# Patient Record
Sex: MALE
[De-identification: names, ages, dates, MRNs, and addresses within clinical notes are randomized per-mention and may not be internally consistent; named-entity substitution may affect disease eponyms.]

---
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
## 2019-12-19 ENCOUNTER — HOSPITAL ENCOUNTER (OUTPATIENT)
Dept: HOSPITAL 53 - M LAB REF | Age: 19
End: 2019-12-19
Attending: PHYSICIAN ASSISTANT
Payer: COMMERCIAL

## 2019-12-19 DIAGNOSIS — E03.9: ICD-10-CM

## 2019-12-19 DIAGNOSIS — G47.33: Primary | ICD-10-CM

## 2019-12-19 DIAGNOSIS — E55.9: ICD-10-CM

## 2019-12-19 LAB
CHLAMYDIA DNA AMPLIFICATION: NEGATIVE
N GONORRHOEA RRNA SPEC QL NAA+PROBE: NEGATIVE

## 2021-02-19 ENCOUNTER — HOSPITAL ENCOUNTER (EMERGENCY)
Dept: HOSPITAL 53 - M ED | Age: 21
Discharge: HOME | End: 2021-02-19
Payer: COMMERCIAL

## 2021-02-19 VITALS — HEIGHT: 66 IN | WEIGHT: 155.32 LBS | BODY MASS INDEX: 24.96 KG/M2

## 2021-02-19 VITALS — DIASTOLIC BLOOD PRESSURE: 62 MMHG | SYSTOLIC BLOOD PRESSURE: 116 MMHG

## 2021-02-19 DIAGNOSIS — R68.83: ICD-10-CM

## 2021-02-19 DIAGNOSIS — Z72.51: ICD-10-CM

## 2021-02-19 DIAGNOSIS — J06.9: Primary | ICD-10-CM

## 2021-02-19 DIAGNOSIS — R09.81: ICD-10-CM

## 2021-02-19 DIAGNOSIS — R51.9: ICD-10-CM

## 2021-02-19 DIAGNOSIS — Z20.822: ICD-10-CM

## 2021-02-19 LAB
CHLAMYDIA DNA AMPLIFICATION: NEGATIVE
N GONORRHOEA RRNA SPEC QL NAA+PROBE: NEGATIVE

## 2021-02-19 PROCEDURE — 87661 TRICHOMONAS VAGINALIS AMPLIF: CPT

## 2021-02-19 PROCEDURE — 99283 EMERGENCY DEPT VISIT LOW MDM: CPT

## 2021-02-19 NOTE — CCD
Summarization Of Episode

                             Created on: 2021



GINNY ESCALONA

External Reference #: [id number]

: 2000

Sex: Male



Demographics





                          Address                   [address] [place]  [address]

 

                          Home Phone                [phone]

 

                          Preferred Language        English

 

                          Marital Status            Single

 

                          Muslim Affiliation     Orthodoxy: Jehovah's witness

 

                          Race                       or 

 

                          Ethnic Group              YES





Author





                          Author                    [facility]

 

                          Organization              [facility]

 

                          Address                   Unknown

 

                          Phone                     Unavailable







Support





                Name            Relationship    Address         Phone

 

                    FEDEXG              Next Of Kin         [address] [place]  [address]                    [phone]

 

                    Jese PIÑACristy    Next Of Kin         [address] [place] [address]  [address]                    [phone]

 

                Hemalatha Kaye    Next Of Kin     Unknown         Unavailable

 

                    Maximino WILLSNitza ODOM   Next Of [address]  [id number]                [phone]

 

                    Jimi AWAD,  Ruby     Next Of [address]  [address]                    [phone]

 

                    Patricia FNP-C,  Taya Next Of Kin         [address] [place] [address]  [phone]                [phone]

 

                    Simón PNP-C, Marisol Hamilton Next Of Kin         [address] [place] [address]  [phone]                [phone]

 

                    Scott CATALAN,  Lupe   Next Of [address]  [address]                    [phone]

 

                    DELMI Krause,  Ruby    Next Of [address]  [address]                    [phone]

 

                UE              Next Of Kin     Unknown         Unavailable

 

                    Vince CATALAN, RODNEY Andrade Next Of [address]  [address]               [phone]

 

                    Veldenis FNP-C FNP-C,  Taya Next Of Kin         [address] [place] [address]  [address]               [phone]

 

                    Simón PNP-C,  Jovana Next Of [address]  [address]               [phone]

 

                    [place]  [address]                    [phone]

 

                ST              Next Of Kin     Unknown         Unavailable

 

                    CANDY ESCALONA Next Of Kin         [address]

[place], [place]  [address] [phone]

 

                    XAVIER SAEED   Next Of Kin         [address]

[place]  [address]                    [phone]







Care Team Providers





                    Care Team Member Name Role                Phone

 

                    KIM Stanton DDS  Unavailable         Unavailable

 

                    Dille, E Nitza DDS  Unavailable         Unavailable

 

                    Dille, E Nitza DDS  Unavailable         Unavailable

 

                    Dille, E Nitza DDS  Unavailable         Unavailable



                                  



Re-disclosure Warning

          The records that you are about to access may contain information from 
federally-assisted alcohol or drug abuse programs. If such information is 
present, then the following federally mandated warning applies: This information
has been disclosed to you from records protected by federal confidentiality 
rules (42 CFR part 2). The federal rules prohibit you from making any further 
disclosure of this information unless further disclosure is expressly permitted 
by the written consent of the person to whom it pertains or as otherwise 
permitted by 42 CFR part 2. A general authorization for the release of medical 
or other information is NOT sufficient for this purpose. The Federal rules 
restrict any use of the information to criminally investigate or prosecute any 
alcohol or drug abuse patient.The records that you are about to access may 
contain highly sensitive health information, the redisclosure of which is 
protected by Article 27-F of the [facility] Public Health law. If you 
continue you may have access to information: Regarding HIV / AIDS; Provided by 
facilities licensed or operated by the [facility] Office of Mental Health; 
or Provided by the [facility] Office for People With Developmental 
Disabilities. If such information is present, then the following [place] 
mandated warning applies: This information has been disclosed to you from 
confidential records which are protected by state law. State law prohibits you 
from making any further disclosure of this information without the specific 
written consent of the person to whom it pertains, or as otherwise permitted by 
law. Any unauthorized further disclosure in violation of state law may result in
a fine or [facility] sentence or both. A general authorization for the release of 
medical or other information is NOT sufficient authorization for further disc
losure.                                                                         
    



Encounters

          



           Encounter  Providers  Location   Date       Indications Data Source(s

)

 

           Outpatient Attender: Nitza Stanton DDS [facility]     2020 10:12:01 A

[facility]

 

           Outpatient Attender: Nitza Stanton DDS [facility]     2020 03:28:00 P

[facility]

 

           Outpatient Attender: Nitza Stanton DDS [facility]     2020 02:42:00 P

[facility]

 

           Outpatient Attender: Nitza Stanton DDS [facility]     2020 04:37:01 P

[facility]

 

           Outpatient Attender: Nitza Stanton ELMAS [facility]     2020 12:58:00 P

[facility]

 

           Outpatient Attender: Nitza Stanton ELMAS [facility]     2020 01:24:00 P

[facility]

 

           Outpatient Attender: Nitza Saraystevo MINGO [facility]     2020 04:20:01 P

[facility]

 

           Outpatient Attender: Nitza Stanton MINGO [facility]     2020 04:19:01 P

[facility]

 

           Outpatient Attender: Nitza Saraystevo [facility]INGO [facility]     2020 03:24:01 P

[facility]

 

           Outpatient Attender: Nitza Stanton MINGO [facility]     2020 09:27:00 A

[facility]

 

           Outpatient Attender: Nitza Saraystevo MINGO [facility]     2020 07:48:00 A

[facility]

 

           Outpatient Attender: Nitza Stanton MINGO [facility]     2020 07:47:01 A

[facility]

 

           Outpatient Attender: Nitza Saraystevo MINGO [facility]     2020 03:29:01 P

[facility]

 

           Outpatient Attender: Nitza Stanton MINGO [facility]     2020 03:05:00 P

[facility]

 

           Outpatient Attender: Nitza Saraystevo [facility]INGO [facility]     2020 08:18:00 A

[facility]

 

           Outpatient Attender: Nitza Saraystevo MINGO [facility]     2020 02:04:00 P

[facility]

 

           Outpatient Attender: Nitza Maximino AGUILA [facility]     2020 09:22:00 A

[facility]

 

           [facility]: [facility]     [facility] 11:[facility]:00 A

[facility]

 

           Outpatient Attender: Nitza Stanton DDS [facility]     2020 11:03:00 A

[facility]



                                                                                
                                                                                
                                                                                
                         



Medications

          



          Medication Brand Name Start Date Product Form Dose      Route     Admi

nistrative 

Instructions Pharmacy Instructions Status     Indications Reaction   Description

 Data 

Source(s)

 

          875 mg              2020 12:00:00 AM EDT tablet    20           

       TAKE ONE TABLET BY MOUTH EVERY 

12 HOURS FOR 10 DAYS      TAKE ONE TABLET BY MOUTH EVERY 12 HOURS FOR 10 DAYS SO

LD: 

2020                                                      [facility]



                                                                    



Insurance Providers

          



             Payer name   Policy type / Coverage type Policy ID    Covered party

 ID Covered 

party's relationship to mckeon Policy Mckeon             Plan Information

 

          [facility]           [id number]           SP           

       [id number]

 

          [facility] Plan P         [id number]           S     

              [id number]

 

          Medicaid  S         [id number]            S                   [id number]

 

          Sliding Fee Scale O         [id number]           S                   [id number]

[id number]

 

          [facility] P         [id number]           S     

              [id number]

 

          Managed Care - Community Plan [employer] P         [id number]   

        S                   [id number]

 

          Pupil Benefits (pr) Commercial WAT-           Family Dependent

           HBLPR-

 

          [facility] Plan Medigap Part B [id number]           Self            

    [id number]

 

          Pupil Benefits (pr) Commercial 10/1/16             Self               

 10/1/16

 

          [facility]CA[facility]) O         [id number]           S              

     [id number]

 

          [facility] 301/801           [id number]           SP        

          [id number]

 

          Self Pay  P         none                S                   none

 

          Self Pay  P         UNAVAILABLE           S                   UNAVAILA

BLE

 

          Managed Care - Community Plan [employer] P         [id number]   

        S                   [id number]

 

          [facility]CA[facility]) O         [id number]           S              

     [id number]

 

          Medicaid  S         [id number]            S                   [id number]

 

          Medicaid  P         [id number]            S                   [id number]

 

          [facility] P         [id number]           S      

             [id number]

 

          PUPIL BENEFITS HEALTH PL S         14              S              

     14

 

          MEDICAID            [id number]            SP                  [id number]

 

          [facility] O         [id number]           S             

      [id number]

 

          D [facility] O         [id number]           S      

             [id number]

 

          Medicaid Dental O         [id number]            S                   [id number]

[id number]

 

          [facility] O         [id number]            S      

             [id number]



                                                                                
                                                                                
                                                                                
                                                                           



Problems, Conditions, and Diagnoses

          



           Code       Display Name Description Problem Type Effective Dates Data

 Source(s)

 

             521.03       DENTAL CARIES EXTENDING INTO PULP DENTAL CARIES EXTEND

ING INTO PULP              

2020 04:18:10 PM EDT              [facility]



                                                                                
       



Results

          



                    ID                  Date                Data Source

 

                    [id number]    2020 02:49:19 PM EDT [facility]

 

                                        Patient History Medical History:Last Den

[facility]  [facility] 

Medical History is unremarkableLast eye exam - 2014concussion - football 
10/2/2015Family History:FH AllergiesFH HeadachesFH DiabetesSocial/Personal 
History: Smoking Status: current every day smokerCurrent Problems: DENTAL CARIES
EXTENDING INTO PULP (ICD-521.03) (IKA99-N96.63)Vitamin D deficiency, unspecified
(CMO93-N16.9)Screening for thyroid disorders (ICD-V77.0) (KLT48-T41.29)Screening
for lipoid disorders (ICD-V77.91) (KCF26-L64.220)Screening for iron deficiency 
anemia (ICD-V78.0) (ERK94-Z62.0)Shoulder pain, left (ICD-719.41) (ICD10-
M25.512)ACNE VULGARIS (ICD-706.1) (LSX09-B50.0)Maramec teeth impaction (ICD-
520.6) (KRC79-M16.1)MEDICATION MONITORING (ICD-V58.69) (EQK05-Y65.81)Well Child 
Exam WITH Abnormal Findings (under 18) (ICD-V20.2) (XUT52-L43.121)Unspecified 
vitamin D deficiency (ICD-268.9) (YML09-T70.9)Acne (ICD-706.1) (ICD10-
L70.9)Other specified behavioral problem (ICD-V40.39) (KNF43-C00)Current 
Medications: DRISDOL 35479 UNIT ORAL CAPSULE (ERGOCALCIFEROL) ONE PILL ONCE A 
WEEK FOR 16 WEEKS; Route: ORALDOXYCYCLINE HYCLATE 100 MG ORAL CAPSULE 
(DOXYCYCLINE HYCLATE) ONE PILL TWICE A DAY FOR ONE DAY, THEN ONCE DAILY IN THE 
MORNING; Route: ORALCLEOCIN-T 1 % EXTERNAL SOLUTION (CLINDAMYCIN PHOSPHATE) USE 
TWICE A DAY TO CLEAN FACE  FOR 2 WEEKS THEN DECREASE USE TO ONCE A DAY AT 
BEDTIME; Route: EXTERNALPast Medical History:(reviewed - no changes required) 
Last Dental Exam:  [facility]Past Medical History is 
unremarkableLast eye exam - 2014concussion - football 10/2/2015                 
         Dental Chart:  Procedures:Type - CDT Code - Description C - () No 
Charge Visit (Performed by Laisha Zhu)     Existing:Type - CDT Code - 
Description[E] Missing - [place] and Root On #1 Surface O Region XR, #14 Surface O
 Region XR, #16 Surface O Region XR, #17 Surface O Region XR Chart 
Alert:uhcProphy 2 every 12 monthsnext avail has an appt   2017Exam 1 per 6
 month periodnext avail has an appt  2017Fl2 twice every 12 monthsthrough 
age 20next avail   2017Bwx twice every 12 monthsnext avail 
2017Panorex 1 every 3 yearsnext avail no historySealants every 5 yearsage 
5-15  Chart Notes:andrea (Aug 27 2020  3:27PM): Patient presented for Adult 
prophy and exam. Patient informed that he had his wisdom teeth removed by Dr. Akbar 5 days backs. Extraction sites were still open. I consulted with Dr. Stanton
 about prophy. Dr. Stanton recommended that we wait for 2 weeks, to prevent the 
risk of infection. When i informed this to the patient, he was upset and said " 
What the f***". Informed patient not to use such language. Gave patient monojet 
to flush the area, and gave appt for octDismissed patient without any questions 
Laisha Zhu by andrea (2020 3:27 PM): Tooth Notes and Watches:- Tooth 3 
Watch: Sharda Epstein by wojciech (2016 1:19 PM): - Tooth 5 Dentition:
 changed from Permanent to Primary- Tooth 5 Dentition: changed from Permanent to
 Permanent- Tooth 6 Dentition: changed from Permanent to Primary- Tooth 6 
Dentition: changed from Permanent to Permanent- Tooth C Note: 6 erupted 
BuccalGuiles Vivienne RICKS by jguiles (2014 10:05 AM):  Note: There are Un-
Billed (C Type) procedures on this document.Assessment & Plan 
Medications:DRISDOL 36589 UNIT ORAL CAPSULEDOXYCYCLINE HYCLATE 100 MG ORAL 
CAPSULECLEOCIN-T 1 % EXTERNAL SOLUTIONAllergies:No Known Allergies (updated 
2019) Electronically signed by Laisha Zhu on 2020 at 3:27 
PM________________________________________________________________________ 









          Name      Value     Range     Interpretation Code Description Data Sheba

rce(s) Supporting 

Document(s)

 

                                                                       









                    ID                  Date                Data Source

 

                    [id number]    2020 10:25:03 AM EDT [facility]

 

                                        Current Problems: DENTAL CARIES EXTENDIN

G INTO PULP (ICD-521.03) (ICD10-

K02.63)Vitamin D deficiency, unspecified (CSM44-X15.9)Screening for thyroid 
disorders (ICD-V77.0) (YIH86-W36.29)Screening for lipoid disorders (ICD-V77.91) 
(REC62-Y95.220)Screening for iron deficiency anemia (ICD-V78.0) (ICD10-
Z13.0)Shoulder pain, left (ICD-719.41) (MDF40-L33.512)ACNE VULGARIS (ICD-706.1) 
(LFV53-N58.0)Maramec teeth impaction (ICD-520.6) (NQZ64-F01.1)MEDICATION 
MONITORING (ICD-V58.69) (PDK63-F63.81)Well Child Exam WITH Abnormal Findings 
(under 18) (ICD-V20.2) (QMO07-L95.121)Unspecified vitamin D deficiency (ICD-26
8.9) (AWJ12-I82.9)Acne (ICD-706.1) (DRO48-M26.9)Other specified behavioral 
problem (ICD-V40.39) (DMY80-F95)Current Medications: DRISDOL 57532 UNIT ORAL 
CAPSULE (ERGOCALCIFEROL) ONE PILL ONCE A WEEK FOR 16 WEEKS; Route: 
ORALDOXYCYCLINE HYCLATE 100 MG ORAL CAPSULE (DOXYCYCLINE HYCLATE) ONE PILL TWICE
 A DAY FOR ONE DAY, THEN ONCE DAILY IN THE MORNING; Route: ORALCLEOCIN-T 1 % 
EXTERNAL SOLUTION (CLINDAMYCIN PHOSPHATE) USE TWICE A DAY TO CLEAN FACE  FOR 2 
WEEKS THEN DECREASE USE TO ONCE A DAY AT BEDTIME; Route: EXTERNAL               
            Dental Chart:  Procedures:Type - CDT Code - Description B - () 
Amalgam-one surface, primary or permanent on Tooth # 29 on Tooth Surface O 
(Performed by Cristy Sawant DMD) B - () Amalgam, 2 surfaces, primary or 
permanent on Tooth # 3 on Tooth Surface MO (Performed by Cristy Sawant DMD) B - 
() Amalgam, 2 surfaces, primary or permanent on Tooth # 32 on Tooth Surface
 OB (Performed by Cristy Sawant DMD)      Chart Alert:uhcProphy 2 every 12 
monthsnext avail has an appt   2017Exam 1 per 6 month periodnext avail has
 an appt  2017Fl2 twice every 12 monthsthrough age 20next avail   
2017Bwx twice every 12 monthsnext avail 2017Panorex 1 every 3 
yearsnext avail no historySealants every 5 yearsage 5-15  Chart Notes:emilia (2020 12:57PM): [facility] ([facility] Per Pt.). Took temp@ F. Additional PPE requirements 
due to COVID-19 in the dental setting, N95, surgical mask, hair covering, gown  
CC: none. Cetacaine spray used as topical. UR Infiltration and LR INB 1 carp 
Lidocaine HCL 2% with 1:100,000 epi.  Operative: #3-MO Gluma and Amalgam #32-OB 
#29-O Limelight placed and light cured and amalgam. Excavated with High Speed, 
Slow Speed and Spoon. Occlusion checked and polished. No complications. POI 
given to pt. advised pt. to stay away from extreme hot and cold as to not 
irritate teeth and need rct in future. Assisted by VENKATESH Pt was cooperative. NV: 
recall Cristy Sawant DMD by emilia (2020 12:57 PM): Tooth Notes and Watches:- 
Tooth 3 Watch: Sharda Epstein by wojciech (2016 1:19 PM): - Tooth 5 
Dentition: changed from Permanent to Primary- Tooth 5 Dentition: changed from 
Permanent to Permanent- Tooth 6 Dentition: changed from Permanent to Primary- 
Tooth 6 Dentition: changed from Permanent to Permanent- Tooth C Note: 6 erupted 
BuccalGuiles Vivienne RICKS by padma (2014 10:05 AM):  Assessment & Plan 
Medications:DRISDOL 35322 UNIT ORAL CAPSULEDOXYCYCLINE HYCLATE 100 MG ORAL 
CAPSULECLEOCIN-T 1 % EXTERNAL SOLUTIONAllergies:No Known Allergies (updated 
2019) Electronically signed by Cristy Sawant DMD on 2020 at 12:57 P
M________________________________________________________________________ 









          Name      Value     Range     Interpretation Code Description Data Sheba

rce(s) Supporting 

Document(s)

 

                                                                       









                    ID                  Date                Data Source

 

                    [id number]    2020 03:27:02 PM EDT [facility]

 

                                        Current Problems: DENTAL CARIES EXTENDIN

G INTO PULP (ICD-521.03) (ICD10-

K02.63)Vitamin D deficiency, unspecified (IHJ31-O72.9)Screening for thyroid 
disorders (ICD-V77.0) (YON33-I48.29)Screening for lipoid disorders (ICD-V77.91) 
(HVH93-O32.220)Screening for iron deficiency anemia (ICD-V78.0) (ICD10-
Z13.0)Shoulder pain, left (ICD-719.41) (QJE96-P90.512)ACNE VULGARIS (ICD-706.1) 
(XUS81-Q45.0)Maramec teeth impaction (ICD-520.6) (UHS70-I63.1)MEDICATION 
MONITORING (ICD-V58.69) (ZKW24-N70.81)Well Child Exam WITH Abnormal Findings 
(under 18) (ICD-V20.2) (HRD15-G77.121)Unspecified vitamin D deficiency (ICD-26
8.9) (ZCR59-E21.9)Acne (ICD-706.1) (UKC30-V13.9)Other specified behavioral 
problem (ICD-V40.39) (EBE81-G17)Current Medications: DRISDOL 94232 UNIT ORAL 
CAPSULE (ERGOCALCIFEROL) ONE PILL ONCE A WEEK FOR 16 WEEKS; Route: 
ORALDOXYCYCLINE HYCLATE 100 MG ORAL CAPSULE (DOXYCYCLINE HYCLATE) ONE PILL TWICE
 A DAY FOR ONE DAY, THEN ONCE DAILY IN THE MORNING; Route: ORALCLEOCIN-T 1 % 
EXTERNAL SOLUTION (CLINDAMYCIN PHOSPHATE) USE TWICE A DAY TO CLEAN FACE  FOR 2 
WEEKS THEN DECREASE USE TO ONCE A DAY AT BEDTIME; Route: EXTERNAL               
            Dental Chart:  Procedures:Type - CDT Code - Description B - () 
Periodic oral evaluation - established patient (Performed by Cristy Sawant DMD) B 
- () Bitewings, 4 radiographic images (Performed by Cristy Sawant DMD) 
Treatments:Type - CDT Code - Description T - () Extraction, erupted tooth 
or exposed root (elevation and/or forceps removal) on Tooth # 14 (Performed by 
Cristy Sawant DMD)     Chart Alert:uhcProphy 2 every 12 monthsnext avail has an 
appt   2017Exam 1 per 6 month periodnext avail has an appt  2017Fl2 
twice every 12 monthsthrough age 20next avail   2017Bwx twice every 12 
monthsnext avail 2017Panorex 1 every 3 yearsnext avail no historySealants 
every 5 yearsage 5-15  Chart Notes:jlam (2020  4:18PM): [facility] CC:
 none. Took 4 BWX's, Reviewed Xrays. Exam: caries detected. OCS: WNL, IO/ EO 
completed, No significant hard findings upon clinical exam. Endo Ice tested #14 
WNR. Advised pt. to have RCT and crown on #14 or to extract.  Pt. opts to have 
extracted with wisdom teeth. Additional PPE requirements due to COVID-19 in the 
dental setting, N95, surgical mask, hair covering, gown and shieldPt was 
cooperative. OHI given Referral: N/A NV:restorations.Cristy Sawant DMD by emilia 
(2020 4:18 PM): Tooth Notes and Watches:- Tooth 3 Watch: Tete Sharda
 by wojciech (2016 1:19 PM): - Tooth 5 Dentition: changed from Permanent to
 Primary- Tooth 5 Dentition: changed from Permanent to Permanent- Tooth 6 
Dentition: changed from Permanent to Primary- Tooth 6 Dentition: changed from 
Permanent to Permanent- Tooth C Note: 6 erupted BuccalGuiles Vivienne RICKS by 
padma (2014 10:05 AM):  Assessment &amp; Plan Problems:Added: DENTAL 
CARIES EXTENDING INTO PULP (ICD-521.03) (SWL83-R93.63)Medications:DRISDOL 56359 
UNIT ORAL CAPSULEDOXYCYCLINE HYCLATE 100 MG ORAL CAPSULECLEOCIN-T 1 % EXTERNAL 
SOLUTIONAllergies:No Known Allergies (updated 2019) Electronically signed 
by Cristy Sawant DMD on 2020 at 4:18 
PM________________________________________________________________________ 









          Name      Value     Range     Interpretation Code Description Data Sheba

rce(s) Supporting 

Document(s)

 

                                                                       









                    ID                  Date                Data Source

 

                    [id number]    2020 02:50:10 PM EDT [facility]

 

                                        Current Problems: Vitamin D deficiency, 

unspecified (SJZ48-N70.9)Screening for 

thyroid disorders (ICD-V77.0) (GAB50-N56.29)Screening for lipoid disorders (ICD-
V77.91) (TYL95-T11.220)Screening for iron deficiency anemia (ICD-V78.0) (ICD10-
Z13.0)Shoulder pain, left (ICD-719.41) (YYT48-Y40.512)ACNE VULGARIS (ICD-706.1) 
(UQF49-C56.0)[place] teeth impaction (ICD-520.6) (BLK42-O07.1)MEDICATION 
MONITORING (ICD-V58.69) (HHL14-F48.81)Well Child Exam WITH Abnormal Findings 
(under 18) (ICD-V20.2) (ENB51-Q69.121)Unspecified vitamin D deficiency (ICD-
268.9) (MPJ42-J80.9)Acne (ICD-706.1) (XFY35-A38.9)Other specified behavioral 
problem (ICD-V40.39) (WKG85-H80)Current Medications: DRISDOL 58061 UNIT ORAL 
CAPSULE (ERGOCALCIFEROL) ONE PILL ONCE A WEEK FOR 16 WEEKS; Route: 
ORALDOXYCYCLINE HYCLATE 100 MG ORAL CAPSULE (DOXYCYCLINE HYCLATE) ONE PILL TWICE
 A DAY FOR ONE DAY, THEN ONCE DAILY IN THE MORNING; Route: ORALCLEOCIN-T 1 % 
EXTERNAL SOLUTION (CLINDAMYCIN PHOSPHATE) USE TWICE A DAY TO CLEAN FACE  FOR 2 
WEEKS THEN DECREASE USE TO ONCE A DAY AT BEDTIME; Route: EXTERNAL               
            Dental Chart:  Procedures:Type - CDT Code - Description B - () 
Limited oral evaluation - problem focused on Tooth # 17 (Performed by Cristy Sawant DMD) B - () Unspecified preventive procedure, by report on Tooth # 17 
(Performed by Cristy Sawant DMD)      Chart Alert:uhcProphy 2 every 12 monthsnext 
avail has an appt   2017Exam 1 per 6 month periodnext avail has an appt  
2017Fl2 twice every 12 monthsthrough age 20next avail   2017Bwx 
twice every 12 monthsnext avail 2017Panorex 1 every 3 yearsnext avail no 
historySealants every 5 yearsage 5-15  Chart Notes:emilia (2020  3:28PM): 
Additional PPE requirements due to COVID-19 in the dental setting,  N95, 
surgical mask, hair covering, gown S: CC: " I am having pain on the bottom left 
side of my mouth. It is my wisdom tooth. I feel swollen and I need to know where
 I was sent to to have them removed." O: RMHx (-) per pt. no allergies. HPI: A 
few days  PL: 7.BP: 129/75  No xray taken today as Panorex taken a few months 
ago.  #17 decayed into the pulp. No signs of infection at this time.A: DDS 
recommends to follow up with OS referral  , DX:#17 decayed into pulp.P:follow up
 with OSInformed Pt about new pain management policy of the clinic regarding 
about narcotic,told pt to alternate Ibuprophen 600- 800mg and tylenol 500mg ever
y 4 to 6 hrs for pain when needed.  Assisted By:AM    NV: pako ARELLANO/Cristy Perez DMD by emilia (2020 3:25 PM): Cristy Sawant DMD by emilia (2020 3:28 PM): 
Tooth Notes and Watches:- Tooth 3 Watch: Sharda Epstein by wojciech 
(2016 1:19 PM): - Tooth 5 Dentition: changed from Permanent to Primary- 
Tooth 5 Dentition: changed from Permanent to Permanent- Tooth 6 Dentition: 
changed from Permanent to Primary- Tooth 6 Dentition: changed from Permanent to 
Permanent- Tooth C Note: 6 erupted BuccalGuiles Vivienne RICKS by padma 
(2014 10:05 AM):  Assessment & Plan Medications:DRISDOL 29110 UNIT ORAL 
CAPSULEDOXYCYCLINE HYCLATE 100 MG ORAL CAPSULECLEOCIN-T 1 % EXTERNAL 
SOLUTIONAllergies:No Known Allergies (updated 2019) Electronically signed 
by Cristy Sawant DMD on 2020 at 3:28 PM______
__________________________________________________________________ 









          Name      Value     Range     Interpretation Code Description Data Sheba

rce(s) Supporting 

Document(s)

 

                                                                       









                    ID                  Date                Data Source

 

                    [id number]    2020 12:35:57 PM [facility]

 

                                        Current Problems: Vitamin D deficiency, 

unspecified (ZIU93-X14.9)Screening for 

thyroid disorders (ICD-V77.0) (TUA08-A06.29)Screening for lipoid disorders (ICD-
V77.91) (LKT71-E32.220)Screening for iron deficiency anemia (ICD-V78.0) (ICD10-
Z13.0)Shoulder pain, left (ICD-719.41) (RAS16-R27.512)ACNE VULGARIS (ICD-706.1) 
(VZJ90-G05.0)[place] teeth impaction (ICD-520.6) (BGB96-P55.1)MEDICATION 
MONITORING (ICD-V58.69) (FTI08-O90.81)Well Child Exam WITH Abnormal Findings 
(under 18) (ICD-V20.2) (HRR01-W52.121)Unspecified vitamin D deficiency (ICD-
268.9) (IMI24-X59.9)Acne (ICD-706.1) (HGR21-O11.9)Other specified behavioral 
problem (ICD-V40.39) (ZPS03-I18)Current Medications: DRISDOL 76776 UNIT ORAL 
CAPSULE (ERGOCALCIFEROL) ONE PILL ONCE A WEEK FOR 16 WEEKS; Route: 
ORALDOXYCYCLINE HYCLATE 100 MG ORAL CAPSULE (DOXYCYCLINE HYCLATE) ONE PILL TWICE
 A DAY FOR ONE DAY, THEN ONCE DAILY IN THE MORNING; Route: ORALCLEOCIN-T 1 % 
EXTERNAL SOLUTION (CLINDAMYCIN PHOSPHATE) USE TWICE A DAY TO CLEAN FACE  FOR 2 
WEEKS THEN DECREASE USE TO ONCE A DAY AT BEDTIME; Route: EXTERNAL               
            Dental Chart:  Procedures:Type - CDT Code - Description B - () 
Amalgam, 2 surfaces, primary or permanent on Tooth # 18 on Tooth Surface MO 
(Performed by Cristy Sawant DMD) B - () Amalgam, 4 or more surfaces, primary 
or permanent on Tooth # 19 on Tooth Surface OMDB (Performed by Cristy Sawant DMD) 
B - () Amalgam, 2 surfaces, primary or permanent on Tooth # 20 on Tooth 
Surface DO (Performed by Cristy Sawant DMD)      Chart Alert:uhcProphy 2 every 12 
monthsnext avail has an appt   2017Exam 1 per 6 month periodnext avail has
 an appt  2017Fl2 twice every 12 monthsthrough age 20next avail   
2017Bwx twice every 12 monthsnext avail 2017Panorex 1 every 3 
yearsnext avail no historySealants every 5 yearsage 5-15  Chart Notes:emilia (2020  2:03PM): [facility] (N/[facility] Per Pt.). CC: none. Hurricaine Watermelon Topical, LL
 IANB 1 carp Lidocaine HCL 2% with 1:100,000 epi. Operative: #18-MO 19-MODB 20-
DO   Gluma and Amalgam. Excavated with High Speed, Slow Speed and Spoon. 
Occlusion checked. No complications. POI given to pt. Assisted by Rosanne was 
cooperative. NV:  Cristy Morris DMD by emilia (2020 2:03 PM): Tooth Notes 
and Watches:- Tooth 3 Watch: Sharda Epstein by wojciech (2016 1:19 
PM): - Tooth 5 Dentition: changed from Permanent to Primary- Tooth 5 Dentition: 
changed from Permanent to Permanent- Tooth 6 Dentition: changed from Permanent 
to Primary- Tooth 6 Dentition: changed from Permanent to Permanent- Tooth C 
Note: 6 erupted BuccalGuVivienne yancey RDH by padma (2014 10:05 AM):  
Electronically signed by Cristy Sawant DMD on 2020 at 2:03 
PM________________________________________________________________________ 









          Name      Value     Range     Interpretation Code Description Data Sheba

rce(s) Supporting 

Document(s)

 

                                                                       







                                        Procedure

## 2021-02-21 ENCOUNTER — HOSPITAL ENCOUNTER (OUTPATIENT)
Dept: HOSPITAL 53 - M LAB REF | Age: 21
End: 2021-02-21
Attending: PHYSICIAN ASSISTANT
Payer: COMMERCIAL

## 2021-02-21 DIAGNOSIS — J03.90: Primary | ICD-10-CM

## 2021-03-05 ENCOUNTER — HOSPITAL ENCOUNTER (EMERGENCY)
Dept: HOSPITAL 53 - M ED | Age: 21
Discharge: HOME | End: 2021-03-05
Payer: COMMERCIAL

## 2021-03-05 VITALS — DIASTOLIC BLOOD PRESSURE: 75 MMHG | SYSTOLIC BLOOD PRESSURE: 136 MMHG

## 2021-03-05 VITALS — HEIGHT: 66 IN | BODY MASS INDEX: 24.62 KG/M2 | WEIGHT: 153.22 LBS
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
## 2021-03-05 DIAGNOSIS — S20.219A: Primary | ICD-10-CM
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
## 2021-03-05 DIAGNOSIS — R00.1: ICD-10-CM
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
## 2021-03-05 DIAGNOSIS — S16.1XXA: ICD-10-CM

## 2021-03-05 DIAGNOSIS — Z87.891: ICD-10-CM
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
## 2021-03-05 DIAGNOSIS — R51.9: ICD-10-CM

## 2021-03-05 LAB
CK MB CFR.DF SERPL CALC: 1.33
CK MB SERPL-MCNC: 1.6 NG/ML (ref ?–3.6)
CK SERPL-CCNC: 120 U/L (ref 39–308)
TROPONIN I SERPL-MCNC: < 0.02 NG/ML (ref ?–0.1)

## 2021-03-05 NOTE — ECGEPIP
[facility] - ED

                                       

                                       Test Date:    2021

Pat Name:     GINNY ESCALONA     Department:   

Patient ID:   [id number]                 Room:         -

Gender:       Male                     Technician:   FLAKO

:          2000               Requested By: CAMILLE DELGADO PA-C

Order Number: [id number]     Reading MD:   Nicolle Hartman

                                 Measurements

Intervals                              Axis          

Rate:         59                       P:            41

AK:           154                      QRS:          78

QRSD:         86                       T:            56

QT:           390                                    

QTc:          386                                    

                           Interpretive Statements

Sinus bradycardia

ST elevation, probably due to early repolarization

No prior

Electronically Signed on 3-5-2021 20:14:01 EST by Nicolle Hartman
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
## 2021-10-10 ENCOUNTER — HOSPITAL ENCOUNTER (EMERGENCY)
Dept: HOSPITAL 53 - M ED | Age: 21
Discharge: HOME | End: 2021-10-10
Payer: COMMERCIAL

## 2021-10-10 VITALS — SYSTOLIC BLOOD PRESSURE: 138 MMHG | DIASTOLIC BLOOD PRESSURE: 70 MMHG

## 2021-10-10 VITALS — BODY MASS INDEX: 23.92 KG/M2 | WEIGHT: 148.81 LBS | HEIGHT: 66 IN
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
## 2021-10-10 DIAGNOSIS — Z91.013: ICD-10-CM
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
## 2021-10-10 DIAGNOSIS — F17.200: ICD-10-CM

## 2021-10-10 DIAGNOSIS — S01.81XA: ICD-10-CM
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
## 2021-10-10 DIAGNOSIS — S60.211A: Primary | ICD-10-CM

## 2021-10-10 DIAGNOSIS — K04.8: ICD-10-CM
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
## 2022-11-12 ENCOUNTER — HOSPITAL ENCOUNTER (INPATIENT)
Dept: HOSPITAL 53 - M ED | Age: 22
LOS: 5 days | Discharge: HOME | DRG: 756 | End: 2022-11-17
Attending: PSYCHIATRY & NEUROLOGY | Admitting: STUDENT IN AN ORGANIZED HEALTH CARE EDUCATION/TRAINING PROGRAM
Payer: COMMERCIAL

## 2022-11-12 VITALS — WEIGHT: 149.47 LBS | HEIGHT: 66 IN | BODY MASS INDEX: 24.02 KG/M2

## 2022-11-12 DIAGNOSIS — T14.91XA: ICD-10-CM

## 2022-11-12 DIAGNOSIS — T43.222A: ICD-10-CM

## 2022-11-12 DIAGNOSIS — T43.212A: ICD-10-CM

## 2022-11-12 DIAGNOSIS — F41.8: Primary | ICD-10-CM

## 2022-11-12 DIAGNOSIS — Z20.822: ICD-10-CM

## 2022-11-12 DIAGNOSIS — T51.92XA: ICD-10-CM

## 2022-11-12 DIAGNOSIS — Z63.0: ICD-10-CM

## 2022-11-12 DIAGNOSIS — T50.992A: ICD-10-CM

## 2022-11-12 DIAGNOSIS — F60.89: ICD-10-CM

## 2022-11-12 DIAGNOSIS — F43.20: ICD-10-CM

## 2022-11-12 DIAGNOSIS — F10.920: ICD-10-CM

## 2022-11-12 DIAGNOSIS — Z91.013: ICD-10-CM

## 2022-11-12 LAB
ALBUMIN SERPL BCG-MCNC: 3.8 GM/DL (ref 3.2–5.2)
ALT SERPL W P-5'-P-CCNC: 19 U/L (ref 12–78)
AMPHETAMINES UR QL SCN: NEGATIVE
APAP SERPL-MCNC: < 2 UG/ML (ref 10–30)
BARBITURATES UR QL SCN: NEGATIVE
BASOPHILS # BLD AUTO: 0 10^3/UL (ref 0–0.2)
BASOPHILS NFR BLD AUTO: 0.2 % (ref 0–1)
BENZODIAZ UR QL SCN: NEGATIVE
BILIRUB CONJ SERPL-MCNC: 0.3 MG/DL (ref 0–0.2)
BILIRUB SERPL-MCNC: 0.8 MG/DL (ref 0.2–1)
BUN SERPL-MCNC: 5 MG/DL (ref 7–18)
BZE UR QL SCN: NEGATIVE
CALCIUM SERPL-MCNC: 8.9 MG/DL (ref 8.5–10.1)
CANNABINOIDS UR QL SCN: POSITIVE
CHLORIDE SERPL-SCNC: 107 MEQ/L (ref 98–107)
CO2 SERPL-SCNC: 28 MEQ/L (ref 21–32)
CREAT SERPL-MCNC: 0.98 MG/DL (ref 0.7–1.3)
EOSINOPHIL # BLD AUTO: 0.1 10^3/UL (ref 0–0.5)
EOSINOPHIL NFR BLD AUTO: 1.2 % (ref 0–3)
ETHANOL SERPL-MCNC: 0.03 % (ref 0–0.01)
GFR SERPL CREATININE-BSD FRML MDRD: > 60 ML/MIN/{1.73_M2} (ref 60–?)
GLUCOSE SERPL-MCNC: 85 MG/DL (ref 70–100)
HCT VFR BLD AUTO: 40.8 % (ref 42–52)
HGB BLD-MCNC: 14.1 G/DL (ref 13.5–17.5)
LYMPHOCYTES # BLD AUTO: 1.6 10^3/UL (ref 1.5–5)
LYMPHOCYTES NFR BLD AUTO: 19.1 % (ref 24–44)
MCH RBC QN AUTO: 29.1 PG (ref 27–33)
MCHC RBC AUTO-ENTMCNC: 34.6 G/DL (ref 32–36.5)
MCV RBC AUTO: 84.3 FL (ref 80–96)
METHADONE UR QL SCN: NEGATIVE
MONOCYTES # BLD AUTO: 0.5 10^3/UL (ref 0–0.8)
MONOCYTES NFR BLD AUTO: 6 % (ref 2–8)
NEUTROPHILS # BLD AUTO: 6.1 10^3/UL (ref 1.5–8.5)
NEUTROPHILS NFR BLD AUTO: 73.1 % (ref 36–66)
OPIATES UR QL SCN: NEGATIVE
PCP UR QL SCN: NEGATIVE
PLATELET # BLD AUTO: 244 10^3/UL (ref 150–450)
POTASSIUM SERPL-SCNC: 3.9 MEQ/L (ref 3.5–5.1)
PROT SERPL-MCNC: 7 GM/DL (ref 6.4–8.2)
RBC # BLD AUTO: 4.84 10^6/UL (ref 4.3–6.1)
RSV RNA NPH QL NAA+PROBE: NEGATIVE
SALICYLATES SERPL-MCNC: < 1.7 MG/DL (ref 5–30)
SODIUM SERPL-SCNC: 140 MEQ/L (ref 136–145)
TSH SERPL DL<=0.005 MIU/L-ACNC: 0.81 UIU/ML (ref 0.36–3.74)
WBC # BLD AUTO: 8.3 10^3/UL (ref 4–10)

## 2022-11-12 SDOH — SOCIAL STABILITY - SOCIAL INSECURITY: PROBLEMS IN RELATIONSHIP WITH SPOUSE OR PARTNER: Z63.0

## 2022-11-14 VITALS — DIASTOLIC BLOOD PRESSURE: 72 MMHG | SYSTOLIC BLOOD PRESSURE: 117 MMHG

## 2022-11-14 VITALS — SYSTOLIC BLOOD PRESSURE: 130 MMHG | DIASTOLIC BLOOD PRESSURE: 76 MMHG

## 2022-11-14 VITALS — DIASTOLIC BLOOD PRESSURE: 76 MMHG | SYSTOLIC BLOOD PRESSURE: 130 MMHG

## 2022-11-14 LAB — RSV RNA NPH QL NAA+PROBE: NEGATIVE

## 2022-11-14 RX ADMIN — Medication SCH MG: at 21:00

## 2022-11-14 RX ADMIN — MULTIPLE VITAMINS W/ MINERALS TAB SCH TAB: TAB at 17:43

## 2022-11-14 RX ADMIN — Medication SCH MG: at 17:43

## 2022-11-14 RX ADMIN — FOLIC ACID SCH MG: 1 TABLET ORAL at 17:44

## 2022-11-14 RX ADMIN — NICOTINE SCH PATCH: 21 PATCH, EXTENDED RELEASE TRANSDERMAL at 17:43

## 2022-11-15 VITALS — DIASTOLIC BLOOD PRESSURE: 59 MMHG | SYSTOLIC BLOOD PRESSURE: 113 MMHG

## 2022-11-15 VITALS — SYSTOLIC BLOOD PRESSURE: 124 MMHG | DIASTOLIC BLOOD PRESSURE: 60 MMHG

## 2022-11-15 RX ADMIN — FOLIC ACID SCH MG: 1 TABLET ORAL at 09:00

## 2022-11-15 RX ADMIN — Medication SCH MG: at 09:00

## 2022-11-15 RX ADMIN — MULTIPLE VITAMINS W/ MINERALS TAB SCH TAB: TAB at 09:00

## 2022-11-15 RX ADMIN — NICOTINE SCH PATCH: 21 PATCH, EXTENDED RELEASE TRANSDERMAL at 09:00

## 2022-11-16 VITALS — DIASTOLIC BLOOD PRESSURE: 72 MMHG | SYSTOLIC BLOOD PRESSURE: 122 MMHG

## 2022-11-16 VITALS — SYSTOLIC BLOOD PRESSURE: 122 MMHG | DIASTOLIC BLOOD PRESSURE: 56 MMHG

## 2022-11-16 RX ADMIN — NICOTINE SCH PATCH: 21 PATCH, EXTENDED RELEASE TRANSDERMAL at 09:00

## 2022-11-16 RX ADMIN — PROPRANOLOL HYDROCHLORIDE SCH MG: 10 TABLET ORAL at 16:41

## 2022-11-16 RX ADMIN — PROPRANOLOL HYDROCHLORIDE SCH MG: 10 TABLET ORAL at 20:07

## 2022-11-16 RX ADMIN — PROPRANOLOL HYDROCHLORIDE SCH MG: 10 TABLET ORAL at 09:40

## 2022-11-17 VITALS — SYSTOLIC BLOOD PRESSURE: 138 MMHG | DIASTOLIC BLOOD PRESSURE: 73 MMHG

## 2022-11-17 VITALS — DIASTOLIC BLOOD PRESSURE: 52 MMHG | SYSTOLIC BLOOD PRESSURE: 105 MMHG

## 2022-11-17 RX ADMIN — NICOTINE SCH PATCH: 21 PATCH, EXTENDED RELEASE TRANSDERMAL at 08:09

## 2022-11-17 RX ADMIN — PROPRANOLOL HYDROCHLORIDE SCH MG: 10 TABLET ORAL at 08:11

## 2023-03-08 ENCOUNTER — HOSPITAL ENCOUNTER (OUTPATIENT)
Dept: HOSPITAL 53 - M EMP | Age: 23
End: 2023-03-08
Attending: FAMILY MEDICINE

## 2023-03-08 DIAGNOSIS — Z11.52: Primary | ICD-10-CM

## 2023-03-13 ENCOUNTER — HOSPITAL ENCOUNTER (OUTPATIENT)
Dept: HOSPITAL 53 - M EMP | Age: 23
End: 2023-03-13
Attending: FAMILY MEDICINE

## 2023-03-13 DIAGNOSIS — Z11.52: Primary | ICD-10-CM

## 2023-03-21 ENCOUNTER — HOSPITAL ENCOUNTER (OUTPATIENT)
Dept: HOSPITAL 53 - M EMP | Age: 23
End: 2023-03-21
Attending: FAMILY MEDICINE

## 2023-03-21 DIAGNOSIS — Z11.52: Primary | ICD-10-CM

## 2023-03-27 ENCOUNTER — HOSPITAL ENCOUNTER (OUTPATIENT)
Dept: HOSPITAL 53 - M EMP | Age: 23
End: 2023-03-27
Attending: FAMILY MEDICINE

## 2023-03-27 DIAGNOSIS — Z11.52: Primary | ICD-10-CM
